# Patient Record
Sex: MALE | Race: WHITE
[De-identification: names, ages, dates, MRNs, and addresses within clinical notes are randomized per-mention and may not be internally consistent; named-entity substitution may affect disease eponyms.]

---

## 2017-10-13 ENCOUNTER — HOSPITAL ENCOUNTER (OUTPATIENT)
Dept: HOSPITAL 62 - RAD | Age: 67
End: 2017-10-13
Attending: PHYSICIAN ASSISTANT
Payer: MEDICARE

## 2017-10-13 DIAGNOSIS — R42: ICD-10-CM

## 2017-10-13 DIAGNOSIS — R51: Primary | ICD-10-CM

## 2017-10-13 PROCEDURE — 82565 ASSAY OF CREATININE: CPT

## 2017-10-13 PROCEDURE — 93880 EXTRACRANIAL BILAT STUDY: CPT

## 2017-10-13 PROCEDURE — 70553 MRI BRAIN STEM W/O & W/DYE: CPT

## 2017-10-13 PROCEDURE — A9577 INJ MULTIHANCE: HCPCS

## 2017-10-13 NOTE — RADIOLOGY REPORT (SQ)
EXAM DESCRIPTION:  CAROTID DOPPLER



COMPLETED DATE/TIME:  10/13/2017 12:27 pm



REASON FOR STUDY:  DIZZINESS R51  HEADACHE R42  DIZZINESS AND GIDDINESS



COMPARISON:  MRI brain 10/13/2017



TECHNIQUE:  Grayscale ultrasound, Doppler velocity and spectra, and color Doppler images acquired of 
the extra-cranial carotid and vertebral arteries. Images stored on PACS.



LIMITATIONS:  None.



FINDINGS:  RIGHT CAROTID

CCA Velocities: Within normal limits.

ICA Velocities

 Peak systolic 0.87 m/s.

 End diastolic 0.11 m/s.

Proximal ICA/CCA peak systolic ratio 0.8.

Spectra normal. No significant plaque.

LEFT CAROTID

CCA Velocities: Within normal limits.

ICA Velocities

 Peak systolic 0.94 m/s.

 End diastolic 0.19 m/s.

Proximal ICA/CCA peak systolic ratio 0.8.

Spectra normal. No significant plaque.

VERTEBRAL ARTERIES: Antegrade flow.  Normal waveforms.

SUBCLAVIAN ARTERIES: Not examined

OTHER: No other significant finding.



IMPRESSION:  NO HEMODYNAMICALLY SIGNIFICANT STENOSIS.



COMMENT:  Quality ID #195:  Velocity criteria are extrapolated from the diameter data as defined by t
he Society of Radiologists in Ultrasound Consensus Conference. Radiology 2003: 229; 340-346.



TECHNICAL DOCUMENTATION:  JOB ID:  [id number]

 2011 [employer]- All Rights Reserved

## 2017-10-13 NOTE — RADIOLOGY REPORT (SQ)
EXAM DESCRIPTION:  MRI HEAD COMBO



COMPLETED DATE/TIME:  10/13/2017 11:39 am



REASON FOR STUDY:  DIZZINESS/HEADACHE R51  HEADACHE R42  DIZZINESS AND GIDDINESS



COMPARISON:  None.



TECHNIQUE:  Multiplanar imaging includes noncontrasted T1, T2, FLAIR, diffusion with ADC map and post
gadolinium contrast T1 sequences. Images stored on PACS.



CONTRAST TYPE AND DOSE:  10 mL Multihance.



RENAL FUNCTION:  GFR > 60.



LIMITATIONS:  None.



FINDINGS:  ANATOMY: No congenital brain anomalies. Normal vascular flow voids. Pituitary fossa normal
.

CSF SPACES: Normal in size and contour. No hemorrhage.

CEREBRUM: Sulci and gyri normal in size and contour. Normal white matter signal on FLAIR imaging. No 
evidence of hemorrhage, mass, or extraaxial fluid collection. No abnormal enhancement post contrast.

POSTERIOR FOSSA: No signal alteration. No hemorrhage. No edema, masses, or mass effect. Internal adán
tory canals, cerebellopontine angles, mastoids normal. No enhancing lesions. No abnormal enhancement 
post contrast.

DIFFUSION IMAGING: Negative for acute or subacute infarction.

ORBITS: No masses.  Right globe post cataract surgery.  Left globe unremarkable

PARANASAL SINUSES: Opacified left maxillary sinus with fluid, sinusitis versus mucus or serous retent
ion cyst

OTHER: No other significant finding.



IMPRESSION:  LEFT MAXILLARY SINUS IS OPACIFIED WITH FLUID, SINUSITIS VERSUS MUCUS OR SEROUS RETENTION
 CYST.

OTHERWISE, UNREMARKABLE MRI OF THE BRAIN WITHOUT AND WITH INTRAVENOUS GADOLINIUM CONTRAST.

EVIDENCE OF ACUTE STROKE: NO.



TECHNICAL DOCUMENTATION:  JOB ID:  [id number]

 2011 [employer]- All Rights Reserved

## 2020-07-06 ENCOUNTER — HOSPITAL ENCOUNTER (EMERGENCY)
Dept: HOSPITAL 62 - ER | Age: 70
Discharge: HOME | End: 2020-07-06
Payer: COMMERCIAL

## 2020-07-06 VITALS — DIASTOLIC BLOOD PRESSURE: 76 MMHG | SYSTOLIC BLOOD PRESSURE: 129 MMHG

## 2020-07-06 DIAGNOSIS — I10: ICD-10-CM

## 2020-07-06 DIAGNOSIS — R53.1: Primary | ICD-10-CM

## 2020-07-06 LAB
ADD MANUAL DIFF: NO
ALBUMIN SERPL-MCNC: 4.6 G/DL (ref 3.5–5)
ALP SERPL-CCNC: 70 U/L (ref 38–126)
ANION GAP SERPL CALC-SCNC: 9 MMOL/L (ref 5–19)
APPEARANCE UR: (no result)
APTT PPP: YELLOW S
AST SERPL-CCNC: 30 U/L (ref 17–59)
BASOPHILS # BLD AUTO: 0 10^3/UL (ref 0–0.2)
BASOPHILS NFR BLD AUTO: 0.5 % (ref 0–2)
BILIRUB DIRECT SERPL-MCNC: 0 MG/DL (ref 0–0.4)
BILIRUB SERPL-MCNC: 0.7 MG/DL (ref 0.2–1.3)
BILIRUB UR QL STRIP: NEGATIVE
BUN SERPL-MCNC: 20 MG/DL (ref 7–20)
CALCIUM: 10.7 MG/DL (ref 8.4–10.2)
CHLORIDE SERPL-SCNC: 99 MMOL/L (ref 98–107)
CK SERPL-CCNC: 68 U/L (ref 55–170)
CO2 SERPL-SCNC: 27 MMOL/L (ref 22–30)
EOSINOPHIL # BLD AUTO: 0.2 10^3/UL (ref 0–0.6)
EOSINOPHIL NFR BLD AUTO: 2.6 % (ref 0–6)
ERYTHROCYTE [DISTWIDTH] IN BLOOD BY AUTOMATED COUNT: 16.4 % (ref 11.5–14)
GLUCOSE SERPL-MCNC: 277 MG/DL (ref 75–110)
GLUCOSE UR STRIP-MCNC: >=500 MG/DL
HCT VFR BLD CALC: 45.3 % (ref 37.9–51)
HGB BLD-MCNC: 16 G/DL (ref 13.5–17)
KETONES UR STRIP-MCNC: (no result) MG/DL
LYMPHOCYTES # BLD AUTO: 1.1 10^3/UL (ref 0.5–4.7)
LYMPHOCYTES NFR BLD AUTO: 11.9 % (ref 13–45)
MCH RBC QN AUTO: 29.9 PG (ref 27–33.4)
MCHC RBC AUTO-ENTMCNC: 35.3 G/DL (ref 32–36)
MCV RBC AUTO: 85 FL (ref 80–97)
MONOCYTES # BLD AUTO: 0.6 10^3/UL (ref 0.1–1.4)
MONOCYTES NFR BLD AUTO: 6.5 % (ref 3–13)
NEUTROPHILS # BLD AUTO: 7.1 10^3/UL (ref 1.7–8.2)
NEUTS SEG NFR BLD AUTO: 78.5 % (ref 42–78)
NITRITE UR QL STRIP: NEGATIVE
PH UR STRIP: 5 [PH] (ref 5–9)
PLATELET # BLD: 243 10^3/UL (ref 150–450)
POTASSIUM SERPL-SCNC: 4.8 MMOL/L (ref 3.6–5)
PROT SERPL-MCNC: 7.5 G/DL (ref 6.3–8.2)
PROT UR STRIP-MCNC: NEGATIVE MG/DL
RBC # BLD AUTO: 5.34 10^6/UL (ref 4.35–5.55)
SP GR UR STRIP: 1.02
TOTAL CELLS COUNTED % (AUTO): 100 %
UROBILINOGEN UR-MCNC: 2 MG/DL (ref ?–2)
WBC # BLD AUTO: 9 10^3/UL (ref 4–10.5)

## 2020-07-06 PROCEDURE — 80053 COMPREHEN METABOLIC PANEL: CPT

## 2020-07-06 PROCEDURE — 81001 URINALYSIS AUTO W/SCOPE: CPT

## 2020-07-06 PROCEDURE — 83735 ASSAY OF MAGNESIUM: CPT

## 2020-07-06 PROCEDURE — 82550 ASSAY OF CK (CPK): CPT

## 2020-07-06 PROCEDURE — 36415 COLL VENOUS BLD VENIPUNCTURE: CPT

## 2020-07-06 PROCEDURE — 71045 X-RAY EXAM CHEST 1 VIEW: CPT

## 2020-07-06 PROCEDURE — 93005 ELECTROCARDIOGRAM TRACING: CPT

## 2020-07-06 PROCEDURE — 99285 EMERGENCY DEPT VISIT HI MDM: CPT

## 2020-07-06 PROCEDURE — 84484 ASSAY OF TROPONIN QUANT: CPT

## 2020-07-06 PROCEDURE — 85025 COMPLETE CBC W/AUTO DIFF WBC: CPT

## 2020-07-06 PROCEDURE — 71046 X-RAY EXAM CHEST 2 VIEWS: CPT

## 2020-07-06 PROCEDURE — 93010 ELECTROCARDIOGRAM REPORT: CPT

## 2020-07-06 PROCEDURE — 84443 ASSAY THYROID STIM HORMONE: CPT

## 2020-07-06 NOTE — ER DOCUMENT REPORT
ED Medical Screen (RME)





- General


Chief Complaint: General Weakness


Stated Complaint: WEAKNESS


Time Seen by Provider: 07/06/20 12:02


Mode of Arrival: Ambulatory


Information source: Patient


Notes: 





70-year-old male presented to ED for complaint of lightheadedness which has 

increased over the last 2 days.  He states he did have a carotid scan echo and 

is seen his cardiologist last week.  He does have a cardiac monitor on the 

supposed to mail and when the week is over.  He states he has been feeling 

clammy weeks hand tingling and lightheaded which is increased over the last 2 

days.  He states he cannot get a hold to his cardiologist because he is on 

vacation at this time.  He does have a history of high blood pressure 

cholesterol diabetes type 2 inguinal hernia to the left repaired a colon 

resection with 12 inches of colon removed for colon polyp and a colonoscopy.  He

does not smoke but he is a former smoker does not drink or use any illicit 

drugs.  Lives with his wife and he is retired.  Lungs are clear to auscultation 

respirations are regular nonlabored at this time.

















I have greeted and performed a rapid initial assessment of this patient.  A 

comprehensive ED assessment and evaluation of the patient, analysis of test 

results and completion of medical decision making process will be conducted by 

an additional ED providers.


TRAVEL OUTSIDE OF THE [place] IN LAST 30 DAYS: No





- Related Data


Allergies/Adverse Reactions: 


                                        





No Known Allergies Allergy (Unverified 07/14/16 13:04)


   











Past Medical History





- Past Medical History


Cardiac Medical History: Reports: Hx Hypertension


   Denies: Hx Heart Attack


Pulmonary Medical History: 


   Denies: Hx Asthma


Neurological Medical History: Denies: Hx Cerebrovascular Accident, Hx Seizures


GI Medical History: Denies: Hx Hepatitis, Hx Hiatal Hernia, Hx Ulcer


Infectious Medical History: Denies: Hx Hepatitis


Past Surgical History: Denies: Hx Open Heart Surgery, Hx Pacemaker





Physical Exam





- Vital signs


Vitals: 





                                        











Temp Pulse Resp BP Pulse Ox


 


 98.9 F   98   20   137/53 H  99 


 


 07/06/20 11:31  07/06/20 11:31  07/06/20 11:31 07/06/20 11:31 07/06/20 11:31














Course





- Vital Signs


Vital signs: 





                                        











Temp Pulse Resp BP Pulse Ox


 


 98.9 F   98   20   137/53 H  99 


 


 07/06/20 11:31 07/06/20 11:31  07/06/20 11:31  07/06/20 11:31  07/06/20 11:31

## 2020-07-06 NOTE — RADIOLOGY REPORT (SQ)
EXAM DESCRIPTION:  CHEST SINGLE VIEW



IMAGES COMPLETED DATE/TIME:  7/6/2020 3:19 pm



REASON FOR STUDY:  eval possible pulmonary nodule



COMPARISON:  7/6/2020



EXAM PARAMETERS:  NUMBER OF VIEWS: One view.

TECHNIQUE: Single frontal radiographic view of the chest acquired.  Nipple markers in place.

RADIATION DOSE: NA

LIMITATIONS: None.



FINDINGS:  LUNGS AND PLEURA: No opacities, masses or pneumothorax. No pleural effusion.

MEDIASTINUM AND HILAR STRUCTURES: No masses.  Contour normal.

HEART AND VASCULAR STRUCTURES: Heart normal in size.  Normal vasculature.

BONES: No acute findings.

HARDWARE: Loop recorder overlies left chest.

OTHER: No other significant finding.



IMPRESSION:  No evidence of acute cardiopulmonary process.  No discrete pulmonary nodule identified.



TECHNICAL DOCUMENTATION:  JOB ID:  [id number]

 2011 [employer]- All Rights Reserved



Reading location - IP/workstation name: [email or web address]

## 2020-07-06 NOTE — RADIOLOGY REPORT (SQ)
EXAM DESCRIPTION:  CHEST 2 VIEWS



IMAGES COMPLETED DATE/TIME:  7/6/2020 12:45 pm



REASON FOR STUDY:  Clammy lightheaded tingling fingers weakness



COMPARISON:  None.



EXAM PARAMETERS:  NUMBER OF VIEWS: two views

TECHNIQUE: Digital Frontal and Lateral radiographic views of the chest acquired.

RADIATION DOSE: NA

LIMITATIONS: none



FINDINGS:  LUNGS AND PLEURA: Possible 8 mm nodule in the right base.  This could represent nipple sha
óscar.

MEDIASTINUM AND HILAR STRUCTURES: No masses or contour abnormalities.

HEART AND VASCULAR STRUCTURES: Heart normal size.  No evidence for failure.

BONES: No acute findings.

HARDWARE: Electronic device on the left side of the chest.

OTHER: No other significant finding.



IMPRESSION:  Cannot exclude a right pulmonary nodule.  Consider repeat PA chest film with nipple patricia
ers.



TECHNICAL DOCUMENTATION:  JOB ID:  [id number]

 2011 [employer]- All Rights Reserved



Reading location - IP/workstation name: [email or web address]

## 2020-07-06 NOTE — EKG REPORT
SEVERITY:- ABNORMAL ECG -

SINUS RHYTHM

LEFT ANTERIOR FASCICULAR BLOCK

CONSIDER ANTEROSEPTAL INFARCT

:

Confirmed by: Mele Yna MD 06-Jul-2020 13:10:10
CHEST PAIN

## 2020-07-06 NOTE — ER DOCUMENT REPORT
ED Dizziness/Weakness





- General


Chief Complaint: General Weakness


Stated Complaint: WEAKNESS


Time Seen by Provider: 07/06/20 12:02


Primary Care Provider: 


ALYSON MOELLER MD [Primary Care Provider] - Follow up as needed


Mode of Arrival: Ambulatory


Information source: Patient


Notes: 





This is a 70-year-old male patient presenting to the emergency department with c

oncerns for lightheadedness, clammy, and bilateral extremity upper and lower 

tingling and generalized weakness.  He reports her symptoms started this morning

at 7 AM.  He denies any chest pain or shortness of breath.  He does have a 

history of hypertension, hyperlipidemia and diabetes.  He is a former smoker 

that quit 30 years ago.  He sees Dr. Palumbo in [place], he had an echocardiogram

done on Thursday and he currently has a Holter monitor in place.  He states the 

reason for the Holter monitor and recent echo is due to the symptoms that he is 

presenting with today.





TRAVEL OUTSIDE OF THE [place] IN LAST 30 DAYS: No





- Related Data


Allergies/Adverse Reactions: 


                                        





No Known Allergies Allergy (Unverified 07/14/16 13:04)


   











Past Medical History





- General


Information source: Patient





- Social History


Smoking Status: Former Smoker


Family History: Reviewed & Not Pertinent


Patient has homicidal ideation: No





- Past Medical History


Cardiac Medical History: Reports: Hx Hypertension


   Denies: Hx Heart Attack


Pulmonary Medical History: 


   Denies: Hx Asthma


Neurological Medical History: Denies: Hx Cerebrovascular Accident, Hx Seizures


GI Medical History: Denies: Hx Hepatitis, Hx Hiatal Hernia, Hx Ulcer


Infectious Medical History: Denies: Hx Hepatitis


Past Surgical History: Denies: Hx Open Heart Surgery, Hx Pacemaker





Review of Systems





- Review of Systems


Constitutional: Malaise, Weakness - Generalized


EENT: No symptoms reported


Cardiovascular: denies: Chest pain, Palpitations, Heart racing


Respiratory: No symptoms reported





Physical Exam





- Vital signs


Vitals: 


                                        











Temp Pulse Resp BP Pulse Ox


 


 98.9 F   98   20   137/53 H  99 


 


 07/06/20 11:31  07/06/20 11:31  07/06/20 11:31  07/06/20 11:31 07/06/20 11:31














- Notes


Notes: 





PHYSICAL EXAMINATION:





GENERAL: Well-appearing, well-nourished and in no acute distress.





HEAD: Atraumatic, normocephalic.





EYES: Pupils equal round and reactive to light, extraocular movements intact, 

sclera anicteric, conjunctiva are normal.





ENT: Nares patent, oropharynx clear without exudates.  Moist mucous membranes.





NECK: Normal range of motion, supple without lymphadenopathy





LUNGS: Breath sounds clear to auscultation bilaterally and equal.  No wheezes 

rales or rhonchi.





HEART: Regular rate and rhythm without murmurs





ABDOMEN: Soft, nontender, nondistended abdomen.  No guarding, no rebound.  No 

masses appreciated.





Musculoskeletal: Normal range of motion, no pitting or edema.  No cyanosis.





NEUROLOGICAL: Cranial nerves grossly intact.  Normal speech, normal gait.  

Normal sensory, motor exams 





PSYCH: Normal mood, normal affect.





SKIN: Warm, Dry, normal turgor, no rashes or lesions noted.





Course





- Re-evaluation


Re-evalutation: 





Patient appears well, nontoxic his vital signs have been within normal limits.  

EKG was reviewed by me shows a sinus rhythm, rate of 91, QTc 424, normal axis, 

no ST segment elevations or depressions to suggest ischemia.  There is no 

previous EKG on file to compare to.  His work-up today has been reassuring.  He 

has had 2- troponins and a normal chest x-ray.  Other laboratory investigations 

were reassuring.  I did discuss this patient with attending physician, Dr. Shelley.  Patient will be discharged home at this time, he will call his 

cardiologist first thing in the morning on follow-up.  He was given strict ED 

return precautions and he was invited to return to the emergency department at 

anytime for reevaluation.  Patient and his wife are in agreement with this plan,

patient reports he feels much better now.





- Vital Signs


Vital signs: 


                                        











Temp Pulse Resp BP Pulse Ox


 


 98.7 F   83   18   129/76 H  98 


 


 07/06/20 17:37  07/06/20 17:37  07/06/20 17:37  07/06/20 17:37  07/06/20 17:37














- Laboratory


Result Diagrams: 


                                 07/06/20 12:15





                                 07/06/20 12:15


Laboratory results interpreted by me: 


                                        











  07/06/20 07/06/20 07/06/20





  12:15 12:15 12:15


 


RDW  16.4 H  


 


Lymph % (Auto)  11.9 L  


 


Seg Neutrophils %  78.5 H  


 


Sodium   134.5 L 


 


Glucose   277 H 


 


Calcium   10.7 H 


 


Urine Glucose (UA)    >=500 H


 


Urine Ketones    TRACE H


 


Urine Urobilinogen    2.0 H


 


Ur Leukocyte Esterase    TRACE H


 


Urine Ascorbic Acid    40 H














Discharge





- Discharge


Clinical Impression: 


 Generalized weakness





Condition: Stable


Disposition: HOME, SELF-CARE


Additional Instructions: 


As discussed please follow-up with your cardiologist, call them tomorrow morning

to schedule an appointment.  Let them know that we did a cardiac work-up today 

and you had 2 negative troponins.  Please return to the emergency department 

with any new or worsening symptoms, we are always happy to reevaluate you.


Referrals: 


ALYSON MOELLER MD [Primary Care Provider] - Follow up as needed

## 2020-08-19 ENCOUNTER — HOSPITAL ENCOUNTER (OUTPATIENT)
Dept: HOSPITAL 62 - OROUT | Age: 70
Discharge: HOME | End: 2020-08-19
Attending: SURGERY
Payer: MEDICARE

## 2020-08-19 VITALS — SYSTOLIC BLOOD PRESSURE: 132 MMHG | DIASTOLIC BLOOD PRESSURE: 62 MMHG

## 2020-08-19 DIAGNOSIS — L02.211: Primary | ICD-10-CM

## 2020-08-19 DIAGNOSIS — Z79.01: ICD-10-CM

## 2020-08-19 LAB
ADD MANUAL DIFF: NO
ANION GAP SERPL CALC-SCNC: 12 MMOL/L (ref 5–19)
BASOPHILS # BLD AUTO: 0 10^3/UL (ref 0–0.2)
BASOPHILS NFR BLD AUTO: 0.6 % (ref 0–2)
BUN SERPL-MCNC: 17 MG/DL (ref 7–20)
CALCIUM: 10 MG/DL (ref 8.4–10.2)
CHLORIDE SERPL-SCNC: 102 MMOL/L (ref 98–107)
CO2 SERPL-SCNC: 24 MMOL/L (ref 22–30)
EOSINOPHIL # BLD AUTO: 0.3 10^3/UL (ref 0–0.6)
EOSINOPHIL NFR BLD AUTO: 3.9 % (ref 0–6)
ERYTHROCYTE [DISTWIDTH] IN BLOOD BY AUTOMATED COUNT: 16 % (ref 11.5–14)
GLUCOSE SERPL-MCNC: 120 MG/DL (ref 75–110)
HCT VFR BLD CALC: 45.4 % (ref 37.9–51)
HGB BLD-MCNC: 15.4 G/DL (ref 13.5–17)
LYMPHOCYTES # BLD AUTO: 1 10^3/UL (ref 0.5–4.7)
LYMPHOCYTES NFR BLD AUTO: 14.8 % (ref 13–45)
MCH RBC QN AUTO: 28.8 PG (ref 27–33.4)
MCHC RBC AUTO-ENTMCNC: 33.9 G/DL (ref 32–36)
MCV RBC AUTO: 85 FL (ref 80–97)
MONOCYTES # BLD AUTO: 0.6 10^3/UL (ref 0.1–1.4)
MONOCYTES NFR BLD AUTO: 8.9 % (ref 3–13)
NEUTROPHILS # BLD AUTO: 4.8 10^3/UL (ref 1.7–8.2)
NEUTS SEG NFR BLD AUTO: 71.8 % (ref 42–78)
PLATELET # BLD: 226 10^3/UL (ref 150–450)
POTASSIUM SERPL-SCNC: 4.4 MMOL/L (ref 3.6–5)
RBC # BLD AUTO: 5.34 10^6/UL (ref 4.35–5.55)
TOTAL CELLS COUNTED % (AUTO): 100 %
WBC # BLD AUTO: 6.7 10^3/UL (ref 4–10.5)

## 2020-08-19 PROCEDURE — 85025 COMPLETE CBC W/AUTO DIFF WBC: CPT

## 2020-08-19 PROCEDURE — 87077 CULTURE AEROBIC IDENTIFY: CPT

## 2020-08-19 PROCEDURE — 87075 CULTR BACTERIA EXCEPT BLOOD: CPT

## 2020-08-19 PROCEDURE — 00820 ANES PX LWR POST ABDL WALL: CPT

## 2020-08-19 PROCEDURE — 80048 BASIC METABOLIC PNL TOTAL CA: CPT

## 2020-08-19 PROCEDURE — 88304 TISSUE EXAM BY PATHOLOGIST: CPT

## 2020-08-19 PROCEDURE — 36415 COLL VENOUS BLD VENIPUNCTURE: CPT

## 2020-08-19 PROCEDURE — 22901 EXC ABDL TUM DEEP 5 CM/>: CPT

## 2020-08-19 PROCEDURE — 87070 CULTURE OTHR SPECIMN AEROBIC: CPT

## 2020-08-19 PROCEDURE — 87205 SMEAR GRAM STAIN: CPT

## 2020-08-19 NOTE — OPERATIVE REPORT
Operative Report


DATE OF SURGERY: 08/19/20


PREOPERATIVE DIAGNOSIS: Infected left sebaceous flank abscess


POSTOPERATIVE DIAGNOSIS: Same


OPERATION: Complete excision of infected left flank sebaceous cyst and closure 

over drain


SURGEON: KENNETH AGRAWAL


ANESTHESIA: LMAC


TISSUE REMOVED OR ALTERED: Infected left sebaceous cyst with abscess


COMPLICATIONS: 





None


ESTIMATED BLOOD LOSS: 10 cc


INTRAOPERATIVE FINDINGS: See below


PROCEDURE: 





Patient was taken the preop holding area to the main operating where LMAC 

anesthesia was induced.  Was placed in the slight right lateral decubitus 

position, left flank prepped with Betadine.





Surgical plan surgical timeout were conducted.





The findings were significant for a moderate to large sized infected 

horizontally oriented sebaceous cyst with overlying necrotic skin, erythema and 

moderate cellulitis.  The acute abscess was approximately 3 x 6 cm.  I a

nesthetized the skin surrounding the abscess with 1/4% Marcaine mixed with 0.5% 

lidocaine.  An elliptical excisional contact was made oriented horizontally to 

encompass the acute abscess.  All acutely inflamed skin, subcutaneous tissue 

necrotic abscess was all excised in its entirety.  The level of the dissection 

was taken down to the fascia the abdominal wall.  The wound was irrigated with 

saline, and bleeders cauterized as encountered.  This was felt to be a near 

complete excision with only a minimal amount of inflammatory changes to the 

superior skin edge.





I closed the wound primarily for 80% of its length with 5 interrupted Ethilon 

sutures in a vertical mattress fashion.  Laterally I brought out through the 

operative incision a 2 Penrose drains, 1/2 inch each, 1 secured with an additi

onal 3-0 Ethilon suture.  4 x 4's applied.  Patient taught procedure well, taken

recovery in stable condition.

## 2020-08-19 NOTE — DISCHARGE SUMMARY
Discharge Summary (SDC)





- Discharge


Final Diagnosis: 





Infected left flank sebaceous cyst


Date of Surgery: 08/19/20


Discharge Date: 08/19/20


Condition: Good


Treatment or Instructions: 


Replace dressings as needed; follow-up with Dr. Gee  in 1 week for possible

drain removal; may shower; patient taking home medications including p.o. 

antibiotics, Tylenol and Motrin


Referrals: 


KENNETH GEE MD [ACTIVE STAFF] - 09/03/20 1:15 pm


Discharge Diet: As Tolerated


Respiratory Treatments at Home: Deep Breathing/Coughing


Discharge Activity: Activity As Tolerated


Home Care Assistance: None Needed


Report the Following to Your Physician Immediately: Shortness of Breath, Fever 

over 101 Degrees, Unusual Bleeding, Redness, Drainage-Yellow, Drainage-Gray, 

Drainage-Green, Drainage-Foul Smelling, IV Site Infection Signs